# Patient Record
Sex: FEMALE | Race: WHITE | NOT HISPANIC OR LATINO | URBAN - METROPOLITAN AREA
[De-identification: names, ages, dates, MRNs, and addresses within clinical notes are randomized per-mention and may not be internally consistent; named-entity substitution may affect disease eponyms.]

---

## 2017-09-27 ENCOUNTER — OUTPATIENT (OUTPATIENT)
Dept: OUTPATIENT SERVICES | Facility: HOSPITAL | Age: 62
LOS: 1 days | Discharge: HOME | End: 2017-09-27

## 2017-09-27 DIAGNOSIS — Z12.31 ENCOUNTER FOR SCREENING MAMMOGRAM FOR MALIGNANT NEOPLASM OF BREAST: ICD-10-CM

## 2017-10-11 ENCOUNTER — OUTPATIENT (OUTPATIENT)
Dept: OUTPATIENT SERVICES | Facility: HOSPITAL | Age: 62
LOS: 1 days | Discharge: HOME | End: 2017-10-11

## 2017-10-11 DIAGNOSIS — R92.8 OTHER ABNORMAL AND INCONCLUSIVE FINDINGS ON DIAGNOSTIC IMAGING OF BREAST: ICD-10-CM

## 2020-11-12 ENCOUNTER — OUTPATIENT (OUTPATIENT)
Dept: OUTPATIENT SERVICES | Facility: HOSPITAL | Age: 65
LOS: 1 days | Discharge: HOME | End: 2020-11-12
Payer: MEDICARE

## 2020-11-12 DIAGNOSIS — Z12.31 ENCOUNTER FOR SCREENING MAMMOGRAM FOR MALIGNANT NEOPLASM OF BREAST: ICD-10-CM

## 2020-11-12 PROCEDURE — 77063 BREAST TOMOSYNTHESIS BI: CPT | Mod: 26

## 2020-11-12 PROCEDURE — 77067 SCR MAMMO BI INCL CAD: CPT | Mod: 26

## 2021-01-12 ENCOUNTER — EMERGENCY (EMERGENCY)
Facility: HOSPITAL | Age: 66
LOS: 0 days | Discharge: HOME | End: 2021-01-12
Attending: EMERGENCY MEDICINE | Admitting: EMERGENCY MEDICINE
Payer: MEDICARE

## 2021-01-12 VITALS
SYSTOLIC BLOOD PRESSURE: 122 MMHG | DIASTOLIC BLOOD PRESSURE: 61 MMHG | OXYGEN SATURATION: 99 % | TEMPERATURE: 98 F | RESPIRATION RATE: 20 BRPM | HEART RATE: 67 BPM

## 2021-01-12 VITALS
RESPIRATION RATE: 20 BRPM | SYSTOLIC BLOOD PRESSURE: 126 MMHG | TEMPERATURE: 97 F | HEART RATE: 65 BPM | WEIGHT: 169.98 LBS | OXYGEN SATURATION: 99 % | DIASTOLIC BLOOD PRESSURE: 66 MMHG

## 2021-01-12 DIAGNOSIS — N20.0 CALCULUS OF KIDNEY: ICD-10-CM

## 2021-01-12 DIAGNOSIS — F17.200 NICOTINE DEPENDENCE, UNSPECIFIED, UNCOMPLICATED: ICD-10-CM

## 2021-01-12 DIAGNOSIS — U07.1 COVID-19: ICD-10-CM

## 2021-01-12 DIAGNOSIS — Z88.0 ALLERGY STATUS TO PENICILLIN: ICD-10-CM

## 2021-01-12 DIAGNOSIS — R10.32 LEFT LOWER QUADRANT PAIN: ICD-10-CM

## 2021-01-12 DIAGNOSIS — J12.82 PNEUMONIA DUE TO CORONAVIRUS DISEASE 2019: ICD-10-CM

## 2021-01-12 LAB
ALBUMIN SERPL ELPH-MCNC: 3.9 G/DL — SIGNIFICANT CHANGE UP (ref 3.5–5.2)
ALP SERPL-CCNC: 58 U/L — SIGNIFICANT CHANGE UP (ref 30–115)
ALT FLD-CCNC: 15 U/L — SIGNIFICANT CHANGE UP (ref 0–41)
ANION GAP SERPL CALC-SCNC: 11 MMOL/L — SIGNIFICANT CHANGE UP (ref 7–14)
APPEARANCE UR: CLEAR — SIGNIFICANT CHANGE UP
AST SERPL-CCNC: 16 U/L — SIGNIFICANT CHANGE UP (ref 0–41)
BASOPHILS # BLD AUTO: 0.01 K/UL — SIGNIFICANT CHANGE UP (ref 0–0.2)
BASOPHILS NFR BLD AUTO: 0.2 % — SIGNIFICANT CHANGE UP (ref 0–1)
BILIRUB SERPL-MCNC: <0.2 MG/DL — SIGNIFICANT CHANGE UP (ref 0.2–1.2)
BILIRUB UR-MCNC: NEGATIVE — SIGNIFICANT CHANGE UP
BUN SERPL-MCNC: 18 MG/DL — SIGNIFICANT CHANGE UP (ref 10–20)
CALCIUM SERPL-MCNC: 8.8 MG/DL — SIGNIFICANT CHANGE UP (ref 8.5–10.1)
CHLORIDE SERPL-SCNC: 102 MMOL/L — SIGNIFICANT CHANGE UP (ref 98–110)
CO2 SERPL-SCNC: 26 MMOL/L — SIGNIFICANT CHANGE UP (ref 17–32)
COLOR SPEC: YELLOW — SIGNIFICANT CHANGE UP
CREAT SERPL-MCNC: 0.8 MG/DL — SIGNIFICANT CHANGE UP (ref 0.7–1.5)
DIFF PNL FLD: ABNORMAL
EOSINOPHIL # BLD AUTO: 0 K/UL — SIGNIFICANT CHANGE UP (ref 0–0.7)
EOSINOPHIL NFR BLD AUTO: 0 % — SIGNIFICANT CHANGE UP (ref 0–8)
GLUCOSE SERPL-MCNC: 125 MG/DL — HIGH (ref 70–99)
GLUCOSE UR QL: NEGATIVE MG/DL — SIGNIFICANT CHANGE UP
HCT VFR BLD CALC: 35.3 % — LOW (ref 37–47)
HGB BLD-MCNC: 11 G/DL — LOW (ref 12–16)
IMM GRANULOCYTES NFR BLD AUTO: 0.2 % — SIGNIFICANT CHANGE UP (ref 0.1–0.3)
KETONES UR-MCNC: ABNORMAL
LACTATE SERPL-SCNC: 1.2 MMOL/L — SIGNIFICANT CHANGE UP (ref 0.7–2)
LEUKOCYTE ESTERASE UR-ACNC: NEGATIVE — SIGNIFICANT CHANGE UP
LIDOCAIN IGE QN: 50 U/L — SIGNIFICANT CHANGE UP (ref 7–60)
LYMPHOCYTES # BLD AUTO: 1.34 K/UL — SIGNIFICANT CHANGE UP (ref 1.2–3.4)
LYMPHOCYTES # BLD AUTO: 24.1 % — SIGNIFICANT CHANGE UP (ref 20.5–51.1)
MCHC RBC-ENTMCNC: 30.4 PG — SIGNIFICANT CHANGE UP (ref 27–31)
MCHC RBC-ENTMCNC: 31.2 G/DL — LOW (ref 32–37)
MCV RBC AUTO: 97.5 FL — SIGNIFICANT CHANGE UP (ref 81–99)
MONOCYTES # BLD AUTO: 0.4 K/UL — SIGNIFICANT CHANGE UP (ref 0.1–0.6)
MONOCYTES NFR BLD AUTO: 7.2 % — SIGNIFICANT CHANGE UP (ref 1.7–9.3)
NEUTROPHILS # BLD AUTO: 3.81 K/UL — SIGNIFICANT CHANGE UP (ref 1.4–6.5)
NEUTROPHILS NFR BLD AUTO: 68.3 % — SIGNIFICANT CHANGE UP (ref 42.2–75.2)
NITRITE UR-MCNC: NEGATIVE — SIGNIFICANT CHANGE UP
NRBC # BLD: 0 /100 WBCS — SIGNIFICANT CHANGE UP (ref 0–0)
PH UR: 6.5 — SIGNIFICANT CHANGE UP (ref 5–8)
PLATELET # BLD AUTO: 146 K/UL — SIGNIFICANT CHANGE UP (ref 130–400)
POTASSIUM SERPL-MCNC: 3.4 MMOL/L — LOW (ref 3.5–5)
POTASSIUM SERPL-SCNC: 3.4 MMOL/L — LOW (ref 3.5–5)
PROT SERPL-MCNC: 6.5 G/DL — SIGNIFICANT CHANGE UP (ref 6–8)
PROT UR-MCNC: NEGATIVE MG/DL — SIGNIFICANT CHANGE UP
RBC # BLD: 3.62 M/UL — LOW (ref 4.2–5.4)
RBC # FLD: 14.2 % — SIGNIFICANT CHANGE UP (ref 11.5–14.5)
RBC CASTS # UR COMP ASSIST: >50 /HPF
SODIUM SERPL-SCNC: 139 MMOL/L — SIGNIFICANT CHANGE UP (ref 135–146)
SP GR SPEC: 1.01 — SIGNIFICANT CHANGE UP (ref 1.01–1.03)
TROPONIN T SERPL-MCNC: <0.01 NG/ML — SIGNIFICANT CHANGE UP
UROBILINOGEN FLD QL: 0.2 MG/DL — SIGNIFICANT CHANGE UP (ref 0.2–0.2)
WBC # BLD: 5.57 K/UL — SIGNIFICANT CHANGE UP (ref 4.8–10.8)
WBC # FLD AUTO: 5.57 K/UL — SIGNIFICANT CHANGE UP (ref 4.8–10.8)

## 2021-01-12 PROCEDURE — 71045 X-RAY EXAM CHEST 1 VIEW: CPT | Mod: 26

## 2021-01-12 PROCEDURE — 99285 EMERGENCY DEPT VISIT HI MDM: CPT | Mod: CS,GC

## 2021-01-12 PROCEDURE — 74177 CT ABD & PELVIS W/CONTRAST: CPT | Mod: 26

## 2021-01-12 RX ORDER — ONDANSETRON 8 MG/1
4 TABLET, FILM COATED ORAL ONCE
Refills: 0 | Status: COMPLETED | OUTPATIENT
Start: 2021-01-12 | End: 2021-01-12

## 2021-01-12 RX ORDER — ACETAMINOPHEN 500 MG
975 TABLET ORAL ONCE
Refills: 0 | Status: COMPLETED | OUTPATIENT
Start: 2021-01-12 | End: 2021-01-12

## 2021-01-12 RX ORDER — CIPROFLOXACIN LACTATE 400MG/40ML
1 VIAL (ML) INTRAVENOUS
Qty: 7 | Refills: 0
Start: 2021-01-12 | End: 2021-01-18

## 2021-01-12 RX ORDER — TAMSULOSIN HYDROCHLORIDE 0.4 MG/1
1 CAPSULE ORAL
Qty: 30 | Refills: 0
Start: 2021-01-12 | End: 2021-02-10

## 2021-01-12 RX ORDER — SODIUM CHLORIDE 9 MG/ML
1000 INJECTION INTRAMUSCULAR; INTRAVENOUS; SUBCUTANEOUS ONCE
Refills: 0 | Status: COMPLETED | OUTPATIENT
Start: 2021-01-12 | End: 2021-01-12

## 2021-01-12 RX ORDER — MORPHINE SULFATE 50 MG/1
4 CAPSULE, EXTENDED RELEASE ORAL ONCE
Refills: 0 | Status: DISCONTINUED | OUTPATIENT
Start: 2021-01-12 | End: 2021-01-12

## 2021-01-12 RX ADMIN — Medication 975 MILLIGRAM(S): at 10:13

## 2021-01-12 RX ADMIN — MORPHINE SULFATE 4 MILLIGRAM(S): 50 CAPSULE, EXTENDED RELEASE ORAL at 11:14

## 2021-01-12 RX ADMIN — SODIUM CHLORIDE 1000 MILLILITER(S): 9 INJECTION INTRAMUSCULAR; INTRAVENOUS; SUBCUTANEOUS at 10:13

## 2021-01-12 RX ADMIN — ONDANSETRON 4 MILLIGRAM(S): 8 TABLET, FILM COATED ORAL at 10:13

## 2021-01-12 NOTE — ED PROVIDER NOTE - NSFOLLOWUPINSTRUCTIONS_ED_ALL_ED_FT
Kidney Stones    Kidney stones (urolithiasis) are crystal deposits that form inside your kidneys. Pain is caused by the stone moving through the urinary tract, causing spasms of the ureter. Drink enough water and fluids to keep your urine clear or pale yellow. This will help you to pass the stone or stone fragments. If provided a strainer, strain all urine and keep all particulate matter and stones for a follow up appointment with a urologist.    SEEK IMMEDIATE MEDICAL CARE IF YOU HAVE ANY OF THE FOLLOWING SYMPTOMS: pain not controlled with medication, fever/chills, worsening vomiting, inability to urinate, or dizziness/lightheadedness.      Pneumonia    Pneumonia is an infection of the lungs. Pneumonia may be caused by bacteria, viruses, or funguses. Symptoms include coughing, fever, chest pain when breathing deeply or coughing, shortness of breath, fatigue, or muscle aches. Pneumonia can be diagnosed with a medical history and physical exam, as well as other tests which may include a chest X-ray. If you were prescribed an antibiotic medicine, take it as told by your health care provider and do not stop taking the antibiotic even if you start to feel better. Do not use tobacco products, including cigarettes, chewing tobacco, and e-cigarettes.    SEEK IMMEDIATE MEDICAL CARE IF YOU HAVE ANY OF THE FOLLOWING SYMPTOMS: worsening shortness of breath, worsening chest pain, coughing up blood, change in mental status, lightheadedness/dizziness.

## 2021-01-12 NOTE — ED PROVIDER NOTE - CARE PROVIDER_API CALL
Topher Rand)  Gastroenterology; Internal Medicine  [address] [place] [address]  Phone: [phone]  Fax: [phone]  Follow Up Time: 1-3 Days    Roberto Mosquera  UROLOGY  [address] [place] [address], [address]  [place] [address]  Phone: [phone]  Fax: [phone]  Follow Up Time: 1-3 Days

## 2021-01-12 NOTE — ED PROVIDER NOTE - CARE PROVIDERS DIRECT ADDRESSES
[email or web address]

## 2021-01-12 NOTE — ED PROVIDER NOTE - PROVIDER TOKENS
PROVIDER:[TOKEN:[[id number]],FOLLOWUP:[1-3 Days]],PROVIDER:[TOKEN:[[id number]],FOLLOWUP:[1-3 Days]]

## 2021-01-12 NOTE — ED PROVIDER NOTE - NS ED ROS FT
Eyes:  No visual changes, eye pain or discharge.  ENMT:  No hearing changes, pain, no sore throat or runny nose, no difficulty swallowing  Cardiac:  No chest pain, SOB or edema. No chest pain with exertion.  Respiratory:  No cough or respiratory distress. No hemoptysis. No history of asthma or RAD.  GI:  No vomiting, diarrhea + nausea, abdominal pain.  :  No dysuria, frequency or burning.  MS:  No myalgia, muscle weakness, joint pain or back pain.  Neuro:  No headache or weakness.  No LOC.  Skin:  No skin rash.   Endocrine: No history of thyroid disease or diabetes.

## 2021-01-12 NOTE — ED ADULT NURSE NOTE - NSIMPLEMENTINTERV_GEN_ALL_ED
Implemented All Universal Safety Interventions:  [place] to call system. Call bell, personal items and telephone within reach. Instruct patient to call for assistance. Room bathroom lighting operational. Non-slip footwear when patient is off stretcher. Physically safe environment: no spills, clutter or unnecessary equipment. Stretcher in lowest position, wheels locked, appropriate side rails in place.

## 2021-01-12 NOTE — ED PROVIDER NOTE - NSFOLLOWUPCLINICS_GEN_ALL_ED_FT
[facility]  Medicine  [address], [place]   Phone: [phone]  Fax:   Follow Up Time: 1-3 Days

## 2021-01-12 NOTE — ED PROVIDER NOTE - PROGRESS NOTE DETAILS
Notified by radiology of L. kidney stone, RLL infiltrate, and gastric knob suspicious for malignancy. Will obtain UA, start abx, and cont to monitor. Notified by radiology of L. kidney stone, LLL infiltrate, and gastric knob suspicious for malignancy. Will obtain UA, start abx, and cont to monitor. Notified pt of gastric nodule visualized in CT. Advised pt to f/u with GI.

## 2021-01-12 NOTE — ED PROVIDER NOTE - OBJECTIVE STATEMENT
65y F w/ no sig PMH presenting with gradual onset LLQ pain without radiation that started this morning. No alleviating/worsening factors. Has associated nausea without vomiting as well as lightheadedness. Is exposed COVID by . Denies fever, chills, CP, SOB, diarrhea, dysuria, or numbness/tingling.

## 2021-01-12 NOTE — ED PROVIDER NOTE - ATTENDING CONTRIBUTION TO CARE
65y female with malaise, no f/c, no dysuria, +rare cough,  with covid, on exam vital signs appreciated, nontoxic appearing, head nc/at, perrla, EOMI, conj pink op clear neck supple cor rrr lungs cta abd snt no c/c/e pulses equal calves nontender neuro intact, labs and studies reviewed, currently pain free, will d/c isolation,  f/u. Patient counseled regarding conditions which should prompt return.

## 2021-01-12 NOTE — ED PROVIDER NOTE - PATIENT PORTAL LINK FT
You can access the FollowMyHealth Patient Portal offered by [facility] by registering at the following website: [email or web address]. By joining [employer]’s FollowMyHealth portal, you will also be able to view your health information using other applications (apps) compatible with our system.

## 2021-01-13 LAB
CULTURE RESULTS: SIGNIFICANT CHANGE UP
SARS-COV-2 RNA SPEC QL NAA+PROBE: DETECTED
SPECIMEN SOURCE: SIGNIFICANT CHANGE UP

## 2021-02-04 PROBLEM — Z78.9 OTHER SPECIFIED HEALTH STATUS: Chronic | Status: ACTIVE | Noted: 2021-01-12

## 2021-02-04 PROBLEM — Z00.00 ENCOUNTER FOR PREVENTIVE HEALTH EXAMINATION: Status: ACTIVE | Noted: 2021-02-04

## 2021-04-05 ENCOUNTER — APPOINTMENT (OUTPATIENT)
Dept: UROLOGY | Facility: CLINIC | Age: 66
End: 2021-04-05
Payer: MEDICARE

## 2021-04-05 VITALS
WEIGHT: 175 LBS | HEIGHT: 67 IN | BODY MASS INDEX: 27.47 KG/M2 | DIASTOLIC BLOOD PRESSURE: 87 MMHG | SYSTOLIC BLOOD PRESSURE: 143 MMHG | HEART RATE: 72 BPM

## 2021-04-05 DIAGNOSIS — Z81.8 FAMILY HISTORY OF OTHER MENTAL AND BEHAVIORAL DISORDERS: ICD-10-CM

## 2021-04-05 DIAGNOSIS — Z84.1 FAMILY HISTORY OF DISORDERS OF KIDNEY AND URETER: ICD-10-CM

## 2021-04-05 DIAGNOSIS — F17.200 NICOTINE DEPENDENCE, UNSPECIFIED, UNCOMPLICATED: ICD-10-CM

## 2021-04-05 DIAGNOSIS — Z78.9 OTHER SPECIFIED HEALTH STATUS: ICD-10-CM

## 2021-04-05 PROCEDURE — 99204 OFFICE O/P NEW MOD 45 MIN: CPT

## 2021-04-05 RX ORDER — AZITHROMYCIN 500 MG/1
500 TABLET, FILM COATED ORAL
Qty: 5 | Refills: 0 | Status: COMPLETED | COMMUNITY
Start: 2021-01-13

## 2021-04-05 RX ORDER — BISACODYL 5 MG/1
5 TABLET ORAL
Qty: 4 | Refills: 0 | Status: COMPLETED | COMMUNITY
Start: 2021-03-03

## 2021-04-05 RX ORDER — TAMSULOSIN HYDROCHLORIDE 0.4 MG/1
0.4 CAPSULE ORAL
Qty: 30 | Refills: 0 | Status: COMPLETED | COMMUNITY
Start: 2021-01-12

## 2021-04-05 RX ORDER — MULTIVIT-MIN/FOLIC/VIT K/LYCOP 400-300MCG
500 TABLET ORAL
Qty: 60 | Refills: 0 | Status: COMPLETED | COMMUNITY
Start: 2021-01-11

## 2021-04-05 RX ORDER — CASTOR OIL
OIL (ML) ORAL
Qty: 888 | Refills: 0 | Status: COMPLETED | COMMUNITY
Start: 2021-03-03

## 2021-04-05 RX ORDER — ERGOCALCIFEROL 1.25 MG/1
1.25 MG CAPSULE, LIQUID FILLED ORAL
Qty: 12 | Refills: 0 | Status: ACTIVE | COMMUNITY
Start: 2021-01-13

## 2021-04-05 RX ORDER — LEVOFLOXACIN 750 MG/1
750 TABLET, FILM COATED ORAL
Qty: 7 | Refills: 0 | Status: COMPLETED | COMMUNITY
Start: 2021-01-12

## 2021-04-05 NOTE — PHYSICAL EXAM
[General Appearance - Well Developed] : well developed [General Appearance - Well Nourished] : well nourished [Normal Appearance] : normal appearance [Well Groomed] : well groomed [General Appearance - In No Acute Distress] : no acute distress [Edema] : no peripheral edema [] : no respiratory distress [Respiration, Rhythm And Depth] : normal respiratory rhythm and effort [Exaggerated Use Of Accessory Muscles For Inspiration] : no accessory muscle use [Auscultation Breath Sounds / Voice Sounds] : lungs were clear to auscultation bilaterally [Abdomen Soft] : soft [Abdomen Tenderness] : non-tender [Costovertebral Angle Tenderness] : no ~M costovertebral angle tenderness [Normal Station and Gait] : the gait and station were normal for the patient's age [Oriented To Time, Place, And Person] : oriented to person, place, and time [Affect] : the affect was normal [Mood] : the mood was normal [Not Anxious] : not anxious [FreeTextEntry1] : dtr normal

## 2021-04-05 NOTE — HISTORY OF PRESENT ILLNESS
[None] : no symptoms [FreeTextEntry1] : Samantha is a 66-year-old female who was in the emergency room at [facility] January 12, 2021 with left-sided flank pain.  A CAT scan done at that time showed a mid ureteral 7 x 3 x 4 mm stone Hounsfield units of about 950.  She felt better was sent home and for some reason did not get an appointment until now.  She tells me she has had no symptoms since she has not seen the stone.  The CAT scan had a question of a nodular density in her stomach and she is ready had endoscopy which showed nothing a repeat CAT scan was done for GI reasons that was done several weeks ago she cannot remember exactly cannot member where was done I does not know what it showed other than that it also showed the kidneys.\par \par She has no voiding dysfunction no urgency frequency bladder pause has not had any kidney stones in the past.  Her father had stones she does not know what it was they never had an evaluation she does not know what color they was and does know of anyone else that has stones.

## 2021-04-05 NOTE — ASSESSMENT
[FreeTextEntry1] : The stone was there 3 months ago and she has had no symptoms since but it was a fairly big stone to pass without further symptomatology.  Her GI doctor did a CAT scan, reportedly it showed the kidneys I do not know if it showed the ureters and/or if it showed any stones.  She is seeing the doctor tomorrow for colonoscopy and she will get me a copy of the report and/or find out where it was done.  Once we know whether she does or does not have any studies done to show whether it is or is not there we could then decide what to do.  \par \par Given her family history and her own stone though it is her first I did recommend a metabolic work-up to settle things she does not want to go through that pain again.  If we can not prove that the stone is gone I will want a KUB with a renal bladder ultrasound; again that will depend on the study the gastroenterologist did and what it shows

## 2021-04-05 NOTE — LETTER HEADER
[FreeTextEntry3] : Cecil Moise M.D.\par Director of Urology\par [facility]\par [address] [place] [address], [address]\[place] [address]

## 2021-04-05 NOTE — LETTER BODY
[Dear  ___] : Dear  [unfilled], [Consult Letter:] : I had the pleasure of evaluating your patient, [unfilled]. [Please see my note below.] : Please see my note below. [Sincerely,] : Sincerely, [Consult Closing:] : Thank you very much for allowing me to participate in the care of this patient.  If you have any questions, please do not hesitate to contact me. [FreeTextEntry2] : Alfredo Gonzalez MD\par [address]\par [place] [address]\par \par \par [phone]

## 2021-05-12 ENCOUNTER — APPOINTMENT (OUTPATIENT)
Dept: UROLOGY | Facility: CLINIC | Age: 66
End: 2021-05-12
Payer: MEDICARE

## 2021-05-12 VITALS
SYSTOLIC BLOOD PRESSURE: 132 MMHG | WEIGHT: 181 LBS | HEART RATE: 77 BPM | TEMPERATURE: 97.3 F | DIASTOLIC BLOOD PRESSURE: 84 MMHG | BODY MASS INDEX: 28.41 KG/M2 | HEIGHT: 67 IN

## 2021-05-12 PROCEDURE — 99214 OFFICE O/P EST MOD 30 MIN: CPT

## 2021-05-12 NOTE — LETTER BODY
[Dear  ___] : Dear  [unfilled], [Courtesy Letter:] : I had the pleasure of seeing your patient, [unfilled], in my office today. [Please see my note below.] : Please see my note below. [Sincerely,] : Sincerely, [FreeTextEntry2] : Alfredo Gonzalez MD\par [address]\par [place] [address]\par \par \par [phone]

## 2021-05-12 NOTE — ASSESSMENT
[FreeTextEntry1] : I do not know when it past but the CAT scan from March 11 shows no stones on presentation to the emergency room with a stone was 2 months before that.  Her father had stones this is her first stone but she does not want to go through that again in order to help avoid that other than the general rule of drink a lot of fluids avoid high calcium etc. I need to know what is going on with heart to try and do patient specific therapy.  If she can get a metabolic work-up done in fourth while coming back if she cannot that I have nothing to say other than empiric therapy with hydration careful calcium minimal salt watch her oxalate take some citrate take some magnesium etc. and hope that it works\par \par For now if she does a metabolic work-up she will come in so we can review it.  If she decides she is too busy to do it then I do not see a reason for her to come back and until and if she has problems

## 2021-05-12 NOTE — LETTER HEADER
[FreeTextEntry3] : Cecil Moise M.D.\par Director of Urology\par [facility]\par [address] [place] [address], [address]\[place] [address]

## 2021-05-12 NOTE — HISTORY OF PRESENT ILLNESS
[None] : no symptoms [FreeTextEntry1] : Samantha is a 66-year-old female seen April 5, 2021 with left ureteral stone.  We sent her for KUB and sono and notes that she did a CT with contrast on March 11, 2021 before we had seen her but we do not have the results at that time she brings in the results\par \par She also did not want to go through this again, so we arranged for a metabolic work-up that was not done as she has been too busy taking care of her mom.  She says since last seen she is feeling fine and has no urologic issues.

## 2021-06-16 ENCOUNTER — APPOINTMENT (OUTPATIENT)
Dept: UROLOGY | Facility: CLINIC | Age: 66
End: 2021-06-16
Payer: MEDICARE

## 2021-06-16 VITALS
BODY MASS INDEX: 28.72 KG/M2 | SYSTOLIC BLOOD PRESSURE: 156 MMHG | HEIGHT: 67 IN | DIASTOLIC BLOOD PRESSURE: 89 MMHG | WEIGHT: 183 LBS | HEART RATE: 77 BPM | TEMPERATURE: 97.9 F

## 2021-06-16 DIAGNOSIS — N20.1 CALCULUS OF URETER: ICD-10-CM

## 2021-06-16 PROCEDURE — 99214 OFFICE O/P EST MOD 30 MIN: CPT

## 2021-06-16 NOTE — HISTORY OF PRESENT ILLNESS
[FreeTextEntry1] : Mrs. Martinez is a 66-year-old female last seen May 12, 2021 after she passed the stone.  We sent her for metabolic work-up she did that and is here to review that she does not want to go through that again.
No

## 2021-06-16 NOTE — LETTER BODY
[Dear  ___] : Dear  [unfilled], [Courtesy Letter:] : I had the pleasure of seeing your patient, [unfilled], in my office today. [Please see my note below.] : Please see my note below. [Sincerely,] : Sincerely, [FreeTextEntry2] : Alfredo Gonzalez MD\par [address]\par [place] [address]\par \par \par [phone]

## 2021-06-16 NOTE — ASSESSMENT
[FreeTextEntry1] : She has choices of life unfortunately continuing to dehydrate and having ascitic urine and not making stones again is not one of them.  I Grace recommend she see a nephrologist for management.  They could also get an ultrasound perhaps in the late fall and if she has more stones sent her back to me we are more from the treatment side then will further preventive side and I rather she prevent stones and I have to treat them.\par \par Until and if she can get to a nephrologist my recommendation would be to more than doubled her p.o. intake.  The citrate level is okay but she is probably eating a lot of acidic foods we will give her some recommendations on what diet to choose, please see below and if she cannot get to a nephrologist she will come back here but I think the nephrologist would be better\par \par I spoke with the patient about diet modification at length, which includes:\par \par Increasing fluid intake to produce 2 to 2.5 liters of urine per day (approx 3 liters intake), and should be primarily water.  \par \par Calcium intake should be approximately 1000 mg per day.  \par \par Oxalate intake should be reduced- most common sources in diet which have very high levels include peanuts/nuts, tea, coffee, chocolate, spinach, beets, rhubarb, swiss chard.  I've also given/directed to a list with other high oxalate.\par   \par Animal flesh protein should be controlled- approx 4 to 6 ounces per day, with some vegetarian days included in the week.  Studies have shown that vegetarians have half the risk of stones of people who eat only 4 ounces per day of meat or fish of any kind (beef, chicken, fish, shellfish, pork, etc), indicating that a vegetarian lifestyle can decrease future stone risks.\par \par Finally, salt intake should be reduced as high levels in the diet will increase urinary calcium.  <2400 mg per day on a low sodium diet is strongly recommended.\par \par Citrate is a benefit; john and limes with most citrate and least sugar- recommend 'a lemon or lime a day'; easiest with concentrate, mixed into water or other beverages.\par \par Lifestyle changes: regular exercise and weight loss both are independent risk-reducers for stones.\par \par In addition, for further details online, go to www.[employer].com <[email or web address]> <<http://www.[employer].com>> ---> in the lower left column there is a link for "diet resources", and review or download.\par

## 2021-06-16 NOTE — PHYSICAL EXAM
[General Appearance - Well Developed] : well developed [General Appearance - Well Nourished] : well nourished [Well Groomed] : well groomed [Normal Appearance] : normal appearance [General Appearance - In No Acute Distress] : no acute distress [Abdomen Soft] : soft [Abdomen Tenderness] : non-tender [Costovertebral Angle Tenderness] : no ~M costovertebral angle tenderness [] : no respiratory distress [Respiration, Rhythm And Depth] : normal respiratory rhythm and effort [Exaggerated Use Of Accessory Muscles For Inspiration] : no accessory muscle use [Oriented To Time, Place, And Person] : oriented to person, place, and time [Affect] : the affect was normal [Mood] : the mood was normal [Not Anxious] : not anxious [Normal Station and Gait] : the gait and station were normal for the patient's age

## 2021-06-16 NOTE — LETTER HEADER
[FreeTextEntry3] : Cecil Moise M.D.\par Director of Urology\par [facility]\par [address] [place] [address], [address]\[place] [address]

## 2023-06-18 NOTE — ED PROVIDER NOTE - CLINICAL SUMMARY MEDICAL DECISION MAKING FREE TEXT BOX
Awake/Alert
65y female with malaise, no f/c, no dysuria, +rare cough,  with covid, on exam vital signs appreciated, nontoxic appearing, head nc/at, perrla, EOMI, conj pink op clear neck supple cor rrr lungs cta abd snt no c/c/e pulses equal calves nontender neuro intact, labs and studies reviewed, currently pain free, will d/c isolation,  f/u. Patient counseled regarding conditions which should prompt return.